# Patient Record
Sex: FEMALE | Race: WHITE | NOT HISPANIC OR LATINO | Employment: FULL TIME | URBAN - METROPOLITAN AREA
[De-identification: names, ages, dates, MRNs, and addresses within clinical notes are randomized per-mention and may not be internally consistent; named-entity substitution may affect disease eponyms.]

---

## 2017-07-13 ENCOUNTER — TRANSCRIBE ORDERS (OUTPATIENT)
Dept: LAB | Facility: HOSPITAL | Age: 52
End: 2017-07-13

## 2017-07-13 ENCOUNTER — APPOINTMENT (OUTPATIENT)
Dept: LAB | Facility: HOSPITAL | Age: 52
End: 2017-07-13

## 2017-07-13 ENCOUNTER — HOSPITAL ENCOUNTER (OUTPATIENT)
Dept: GENERAL RADIOLOGY | Facility: HOSPITAL | Age: 52
Discharge: HOME | End: 2017-07-13
Admitting: INTERNAL MEDICINE

## 2017-07-13 ENCOUNTER — TRANSCRIBE ORDERS (OUTPATIENT)
Dept: GENERAL RADIOLOGY | Facility: HOSPITAL | Age: 52
End: 2017-07-13

## 2017-07-13 DIAGNOSIS — M79.641 PAIN IN BOTH HANDS: Primary | ICD-10-CM

## 2017-07-13 DIAGNOSIS — M79.642 PAIN IN BOTH HANDS: Primary | ICD-10-CM

## 2017-07-13 DIAGNOSIS — L40.0 PSORIASIS VULGARIS: Primary | ICD-10-CM

## 2017-07-13 DIAGNOSIS — Z79.899 ENCOUNTER FOR LONG-TERM (CURRENT) USE OF MEDICATIONS: ICD-10-CM

## 2017-07-13 LAB
ALBUMIN SERPL-MCNC: 4.3 G/DL (ref 3.2–4.8)
ALBUMIN/GLOB SERPL: 1.4 G/DL (ref 1.5–2.5)
ALP SERPL-CCNC: 86 U/L (ref 25–100)
ALT SERPL W P-5'-P-CCNC: 20 U/L (ref 7–40)
ANION GAP SERPL CALCULATED.3IONS-SCNC: 9 MMOL/L (ref 3–11)
AST SERPL-CCNC: 16 U/L (ref 0–33)
BASOPHILS # BLD AUTO: 0.02 10*3/MM3 (ref 0–0.2)
BASOPHILS NFR BLD AUTO: 0.2 % (ref 0–1)
BILIRUB SERPL-MCNC: 0.4 MG/DL (ref 0.3–1.2)
BUN BLD-MCNC: 18 MG/DL (ref 9–23)
BUN/CREAT SERPL: 30 (ref 7–25)
CALCIUM SPEC-SCNC: 9.5 MG/DL (ref 8.7–10.4)
CHLORIDE SERPL-SCNC: 100 MMOL/L (ref 99–109)
CO2 SERPL-SCNC: 27 MMOL/L (ref 20–31)
CREAT BLD-MCNC: 0.6 MG/DL (ref 0.6–1.3)
DEPRECATED RDW RBC AUTO: 48.2 FL (ref 37–54)
EOSINOPHIL # BLD AUTO: 0.53 10*3/MM3 (ref 0–0.3)
EOSINOPHIL NFR BLD AUTO: 5.6 % (ref 0–3)
ERYTHROCYTE [DISTWIDTH] IN BLOOD BY AUTOMATED COUNT: 14.8 % (ref 11.3–14.5)
GFR SERPL CREATININE-BSD FRML MDRD: 104 ML/MIN/1.73
GLOBULIN UR ELPH-MCNC: 3.1 GM/DL
GLUCOSE BLD-MCNC: 91 MG/DL (ref 70–100)
HCT VFR BLD AUTO: 39.5 % (ref 34.5–44)
HGB BLD-MCNC: 12.6 G/DL (ref 11.5–15.5)
IMM GRANULOCYTES # BLD: 0.02 10*3/MM3 (ref 0–0.03)
IMM GRANULOCYTES NFR BLD: 0.2 % (ref 0–0.6)
LYMPHOCYTES # BLD AUTO: 1.96 10*3/MM3 (ref 0.6–4.8)
LYMPHOCYTES NFR BLD AUTO: 20.7 % (ref 24–44)
MCH RBC QN AUTO: 28.6 PG (ref 27–31)
MCHC RBC AUTO-ENTMCNC: 31.9 G/DL (ref 32–36)
MCV RBC AUTO: 89.6 FL (ref 80–99)
MONOCYTES # BLD AUTO: 0.34 10*3/MM3 (ref 0–1)
MONOCYTES NFR BLD AUTO: 3.6 % (ref 0–12)
NEUTROPHILS # BLD AUTO: 6.61 10*3/MM3 (ref 1.5–8.3)
NEUTROPHILS NFR BLD AUTO: 69.7 % (ref 41–71)
PLATELET # BLD AUTO: 297 10*3/MM3 (ref 150–450)
PMV BLD AUTO: 11.3 FL (ref 6–12)
POTASSIUM BLD-SCNC: 3.4 MMOL/L (ref 3.5–5.5)
PROT SERPL-MCNC: 7.4 G/DL (ref 5.7–8.2)
RBC # BLD AUTO: 4.41 10*6/MM3 (ref 3.89–5.14)
SODIUM BLD-SCNC: 136 MMOL/L (ref 132–146)
WBC NRBC COR # BLD: 9.48 10*3/MM3 (ref 3.5–10.8)

## 2017-07-13 PROCEDURE — 80053 COMPREHEN METABOLIC PANEL: CPT | Performed by: DERMATOLOGY

## 2017-07-13 PROCEDURE — 36415 COLL VENOUS BLD VENIPUNCTURE: CPT | Performed by: DERMATOLOGY

## 2017-07-13 PROCEDURE — 85025 COMPLETE CBC W/AUTO DIFF WBC: CPT | Performed by: DERMATOLOGY

## 2017-07-13 PROCEDURE — 86480 TB TEST CELL IMMUN MEASURE: CPT | Performed by: DERMATOLOGY

## 2017-07-13 PROCEDURE — 73130 X-RAY EXAM OF HAND: CPT

## 2017-07-17 LAB
INTERPRETATION: NORMAL
M TB TUBERC IFN-G BLD QL: NEGATIVE
QFT TB AG MINUS NIL VALUE: <0 IU/ML
QUANTIFERON CRITERIA: NORMAL
QUANTIFERON MITOGEN VALUE: 9.36 IU/ML
QUANTIFERON NIL VALUE: 0.09 IU/ML
QUANTIFERON TB AG VALUE: 0.05 IU/ML

## 2018-11-26 ENCOUNTER — TELEPHONE (OUTPATIENT)
Dept: INTERNAL MEDICINE | Facility: CLINIC | Age: 53
End: 2018-11-26

## 2021-10-11 ENCOUNTER — APPOINTMENT (OUTPATIENT)
Dept: CT IMAGING | Facility: HOSPITAL | Age: 56
End: 2021-10-11

## 2021-10-11 ENCOUNTER — HOSPITAL ENCOUNTER (EMERGENCY)
Facility: HOSPITAL | Age: 56
Discharge: HOME | End: 2021-10-11
Attending: EMERGENCY MEDICINE | Admitting: EMERGENCY MEDICINE

## 2021-10-11 ENCOUNTER — APPOINTMENT (OUTPATIENT)
Dept: GENERAL RADIOLOGY | Facility: HOSPITAL | Age: 56
End: 2021-10-11

## 2021-10-11 VITALS
RESPIRATION RATE: 20 BRPM | BODY MASS INDEX: 38.27 KG/M2 | WEIGHT: 230 LBS | HEART RATE: 73 BPM | TEMPERATURE: 99.7 F | OXYGEN SATURATION: 97 % | SYSTOLIC BLOOD PRESSURE: 129 MMHG | DIASTOLIC BLOOD PRESSURE: 54 MMHG

## 2021-10-11 DIAGNOSIS — U07.1 PNEUMONIA DUE TO COVID-19 VIRUS: Primary | ICD-10-CM

## 2021-10-11 DIAGNOSIS — J12.82 PNEUMONIA DUE TO COVID-19 VIRUS: Primary | ICD-10-CM

## 2021-10-11 DIAGNOSIS — R09.02 HYPOXIA: ICD-10-CM

## 2021-10-11 LAB
ALBUMIN SERPL-MCNC: 3.7 G/DL (ref 3.5–5.2)
ALBUMIN/GLOB SERPL: 1.2 G/DL
ALP SERPL-CCNC: 67 U/L (ref 39–117)
ALT SERPL W P-5'-P-CCNC: 18 U/L (ref 1–33)
ANION GAP SERPL CALCULATED.3IONS-SCNC: 11 MMOL/L (ref 5–15)
AST SERPL-CCNC: 25 U/L (ref 1–32)
BASOPHILS # BLD AUTO: 0 10*3/MM3 (ref 0–0.2)
BASOPHILS NFR BLD AUTO: 0 % (ref 0–1.5)
BILIRUB SERPL-MCNC: 0.2 MG/DL (ref 0–1.2)
BUN SERPL-MCNC: 13 MG/DL (ref 6–20)
BUN/CREAT SERPL: 19.1 (ref 7–25)
CALCIUM SPEC-SCNC: 8 MG/DL (ref 8.6–10.5)
CHLORIDE SERPL-SCNC: 99 MMOL/L (ref 98–107)
CO2 SERPL-SCNC: 25 MMOL/L (ref 22–29)
CREAT SERPL-MCNC: 0.68 MG/DL (ref 0.57–1)
CRP SERPL-MCNC: 4.41 MG/DL (ref 0–0.5)
D DIMER PPP FEU-MCNC: 0.59 MCGFEU/ML (ref 0–0.57)
D-LACTATE SERPL-SCNC: 0.6 MMOL/L (ref 0.5–2)
DEPRECATED RDW RBC AUTO: 45.2 FL (ref 37–54)
EOSINOPHIL # BLD AUTO: 0 10*3/MM3 (ref 0–0.4)
EOSINOPHIL NFR BLD AUTO: 0 % (ref 0.3–6.2)
ERYTHROCYTE [DISTWIDTH] IN BLOOD BY AUTOMATED COUNT: 14.2 % (ref 12.3–15.4)
GFR SERPL CREATININE-BSD FRML MDRD: 89 ML/MIN/1.73
GLOBULIN UR ELPH-MCNC: 3.2 GM/DL
GLUCOSE SERPL-MCNC: 130 MG/DL (ref 65–99)
HCT VFR BLD AUTO: 34.6 % (ref 34–46.6)
HGB BLD-MCNC: 11.7 G/DL (ref 12–15.9)
IMM GRANULOCYTES # BLD AUTO: 0.01 10*3/MM3 (ref 0–0.05)
IMM GRANULOCYTES NFR BLD AUTO: 0.3 % (ref 0–0.5)
LYMPHOCYTES # BLD AUTO: 0.56 10*3/MM3 (ref 0.7–3.1)
LYMPHOCYTES NFR BLD AUTO: 16.5 % (ref 19.6–45.3)
MCH RBC QN AUTO: 29.3 PG (ref 26.6–33)
MCHC RBC AUTO-ENTMCNC: 33.8 G/DL (ref 31.5–35.7)
MCV RBC AUTO: 86.5 FL (ref 79–97)
MONOCYTES # BLD AUTO: 0.1 10*3/MM3 (ref 0.1–0.9)
MONOCYTES NFR BLD AUTO: 2.9 % (ref 5–12)
NEUTROPHILS NFR BLD AUTO: 2.72 10*3/MM3 (ref 1.7–7)
NEUTROPHILS NFR BLD AUTO: 80.3 % (ref 42.7–76)
NRBC BLD AUTO-RTO: 0 /100 WBC (ref 0–0.2)
NT-PROBNP SERPL-MCNC: 169.7 PG/ML (ref 0–900)
PLATELET # BLD AUTO: 151 10*3/MM3 (ref 140–450)
PMV BLD AUTO: 10.4 FL (ref 6–12)
POTASSIUM SERPL-SCNC: 3.6 MMOL/L (ref 3.5–5.2)
PROCALCITONIN SERPL-MCNC: 0.09 NG/ML (ref 0–0.25)
PROT SERPL-MCNC: 6.9 G/DL (ref 6–8.5)
RBC # BLD AUTO: 4 10*6/MM3 (ref 3.77–5.28)
SODIUM SERPL-SCNC: 135 MMOL/L (ref 136–145)
TROPONIN T SERPL-MCNC: <0.01 NG/ML (ref 0–0.03)
WBC # BLD AUTO: 3.39 10*3/MM3 (ref 3.4–10.8)

## 2021-10-11 PROCEDURE — 84145 PROCALCITONIN (PCT): CPT | Performed by: EMERGENCY MEDICINE

## 2021-10-11 PROCEDURE — 99284 EMERGENCY DEPT VISIT MOD MDM: CPT

## 2021-10-11 PROCEDURE — 96374 THER/PROPH/DIAG INJ IV PUSH: CPT

## 2021-10-11 PROCEDURE — 96375 TX/PRO/DX INJ NEW DRUG ADDON: CPT

## 2021-10-11 PROCEDURE — 85379 FIBRIN DEGRADATION QUANT: CPT | Performed by: EMERGENCY MEDICINE

## 2021-10-11 PROCEDURE — 93005 ELECTROCARDIOGRAM TRACING: CPT | Performed by: EMERGENCY MEDICINE

## 2021-10-11 PROCEDURE — 94640 AIRWAY INHALATION TREATMENT: CPT

## 2021-10-11 PROCEDURE — 83605 ASSAY OF LACTIC ACID: CPT | Performed by: EMERGENCY MEDICINE

## 2021-10-11 PROCEDURE — 84484 ASSAY OF TROPONIN QUANT: CPT | Performed by: EMERGENCY MEDICINE

## 2021-10-11 PROCEDURE — 25010000002 IOPAMIDOL 61 % SOLUTION: Performed by: EMERGENCY MEDICINE

## 2021-10-11 PROCEDURE — 71275 CT ANGIOGRAPHY CHEST: CPT

## 2021-10-11 PROCEDURE — 71045 X-RAY EXAM CHEST 1 VIEW: CPT

## 2021-10-11 PROCEDURE — 63710000001 PROMETHAZINE PER 25 MG: Performed by: EMERGENCY MEDICINE

## 2021-10-11 PROCEDURE — 25010000002 ONDANSETRON PER 1 MG: Performed by: EMERGENCY MEDICINE

## 2021-10-11 PROCEDURE — 83880 ASSAY OF NATRIURETIC PEPTIDE: CPT | Performed by: EMERGENCY MEDICINE

## 2021-10-11 PROCEDURE — 85025 COMPLETE CBC W/AUTO DIFF WBC: CPT | Performed by: EMERGENCY MEDICINE

## 2021-10-11 PROCEDURE — 25010000002 DEXAMETHASONE SODIUM PHOSPHATE 10 MG/ML SOLUTION: Performed by: EMERGENCY MEDICINE

## 2021-10-11 PROCEDURE — 80053 COMPREHEN METABOLIC PANEL: CPT | Performed by: EMERGENCY MEDICINE

## 2021-10-11 PROCEDURE — 86140 C-REACTIVE PROTEIN: CPT | Performed by: EMERGENCY MEDICINE

## 2021-10-11 RX ORDER — ONDANSETRON 2 MG/ML
4 INJECTION INTRAMUSCULAR; INTRAVENOUS ONCE
Status: COMPLETED | OUTPATIENT
Start: 2021-10-11 | End: 2021-10-11

## 2021-10-11 RX ORDER — DEXAMETHASONE SODIUM PHOSPHATE 10 MG/ML
6 INJECTION, SOLUTION INTRAMUSCULAR; INTRAVENOUS ONCE
Status: COMPLETED | OUTPATIENT
Start: 2021-10-11 | End: 2021-10-11

## 2021-10-11 RX ORDER — PROMETHAZINE HYDROCHLORIDE 25 MG/1
25 TABLET ORAL EVERY 6 HOURS PRN
Qty: 20 TABLET | Refills: 1 | Status: SHIPPED | OUTPATIENT
Start: 2021-10-11 | End: 2021-11-15

## 2021-10-11 RX ORDER — DEXAMETHASONE 6 MG/1
6 TABLET ORAL
Qty: 5 TABLET | Refills: 0 | Status: SHIPPED | OUTPATIENT
Start: 2021-10-11 | End: 2021-11-15

## 2021-10-11 RX ORDER — SODIUM CHLORIDE 0.9 % (FLUSH) 0.9 %
10 SYRINGE (ML) INJECTION AS NEEDED
Status: DISCONTINUED | OUTPATIENT
Start: 2021-10-11 | End: 2021-10-11 | Stop reason: HOSPADM

## 2021-10-11 RX ORDER — PROMETHAZINE HYDROCHLORIDE 25 MG/1
25 TABLET ORAL ONCE
Status: COMPLETED | OUTPATIENT
Start: 2021-10-11 | End: 2021-10-11

## 2021-10-11 RX ORDER — ALBUTEROL SULFATE 90 UG/1
2 AEROSOL, METERED RESPIRATORY (INHALATION) ONCE
Status: COMPLETED | OUTPATIENT
Start: 2021-10-11 | End: 2021-10-11

## 2021-10-11 RX ORDER — ALBUTEROL SULFATE 2.5 MG/3ML
2.5 SOLUTION RESPIRATORY (INHALATION) EVERY 4 HOURS PRN
Qty: 25 EACH | Refills: 2 | Status: SHIPPED | OUTPATIENT
Start: 2021-10-11 | End: 2021-10-15 | Stop reason: SDUPTHER

## 2021-10-11 RX ORDER — BUDESONIDE 0.5 MG/2ML
0.5 INHALANT ORAL 2 TIMES DAILY
Qty: 30 EACH | Refills: 1 | Status: SHIPPED | OUTPATIENT
Start: 2021-10-11 | End: 2021-10-15 | Stop reason: SDUPTHER

## 2021-10-11 RX ADMIN — PROMETHAZINE HYDROCHLORIDE 25 MG: 25 TABLET ORAL at 05:29

## 2021-10-11 RX ADMIN — IOPAMIDOL 62 ML: 612 INJECTION, SOLUTION INTRAVENOUS at 05:11

## 2021-10-11 RX ADMIN — ALBUTEROL SULFATE 2 PUFF: 90 AEROSOL, METERED RESPIRATORY (INHALATION) at 04:25

## 2021-10-11 RX ADMIN — ONDANSETRON 4 MG: 2 INJECTION INTRAMUSCULAR; INTRAVENOUS at 04:18

## 2021-10-11 RX ADMIN — DEXAMETHASONE SODIUM PHOSPHATE 6 MG: 10 INJECTION, SOLUTION INTRAMUSCULAR; INTRAVENOUS at 04:20

## 2021-10-11 NOTE — ED NOTES
Pt shared concerns about being discharged.  notified. Pt concerns addressed with pt.      Piedad Chandler, RN  10/11/21 2302

## 2021-10-11 NOTE — CASE MANAGEMENT/SOCIAL WORK
Continued Stay Note   [facility]     Patient Name: Nesha Dominguez  MRN: [id number]  Today's Date: 10/11/2021    Admit Date: 10/11/2021     Discharge Plan     Row Name 10/11/21 0841       Plan    Plan Spoke to patient , explained O2, nebulizer orders sent. He will transport patient home when she is ready.    Row Name 10/11/21 0836       Plan    Plan Discharge plan, faxed orders for o2 and nebulizer to DME company. Message left for patient.               Discharge Codes    No documentation.                     Amy Osorio LCSW

## 2021-10-11 NOTE — ED PROVIDER NOTES
Subjective   History of Present Illness    Chief Complaint: Nausea shortness of breath cough, reported hypoxia at home  History of Present Illness: 56-year-old female approximately 1 week of Covid symptoms, worse over the last day or 2.  Covid +5 days ago.  Reports room air sats 84% with exertion at home.  States she feels more short of breath when she was try to go to sleep  Onset: See above timeline  Duration: Persistent  Exacerbating / Alleviating factors: None  Associated symptoms: None      Nurses Notes reviewed and agree, including vitals, allergies, social history and prior medical history.     REVIEW OF SYSTEMS: All systems reviewed and not pertinent unless noted.    Positive for: Nausea shortness of breath Covid positive, reported hypoxia    Negative for: Hemoptysis syncope chest pain palpitations  Review of Systems    Past Medical History:   Diagnosis Date   • Fatigue    • Meniere's disease    • Psoriatic arthritis (HCC)        Allergies   Allergen Reactions   • Biaxin [Clarithromycin]    • Levaquin [Levofloxacin]        Past Surgical History:   Procedure Laterality Date   •  SECTION     • DILATATION AND CURETTAGE     • MOUTH SURGERY      Tooth extraction   • OTHER SURGICAL HISTORY      Renal Lithotripsy       History reviewed. No pertinent family history.    Social History     Socioeconomic History   • Marital status:    Tobacco Use   • Smoking status: Never Smoker   Substance and Sexual Activity   • Alcohol use: Never   • Drug use: Never           Objective   Physical Exam    CONSTITUTIONAL: Well developed, obese nontoxic 56-year-old  female,  in no acute distress.  VITAL SIGNS: per nursing, reviewed and noted  SKIN: exposed skin with no rashes, ulcerations or petechiae.  EYES: perrla. EOMI.  ENT: Normal voice.  Patient maintained wearing a mask throughout patient encounter due to coronavirus pandemic  RESPIRATORY:  No increased work of breathing. No retractions.  Dry cough.   Chest clear auscultation bilaterally  CARDIOVASCULAR:  regular rate and rhythm, no murmurs.  Good Peripheral pulses. Good cap refill to extremities.   GI: Abdomen soft, nontender, normal bowel sounds. No hernia. No ascites.  MUSCULOSKELETAL:  No tenderness. Full ROM. Strength and tone grossly normal.  no spasms. no neck or back tenderness or spasm.   NEUROLOGIC: Alert, oriented x 3. No gross deficits. GCS 15.   PSYCH: appropriate affect.  : no bladder tenderness or distention, no CVA tenderness      Critical Care  Performed by: Librado Plaza DO  Authorized by: Librado Plaza DO     Critical care provider statement:     Critical care time (minutes):  35    Critical care was necessary to treat or prevent imminent or life-threatening deterioration of the following conditions:  Respiratory failure    Critical care was time spent personally by me on the following activities:  Development of treatment plan with patient or surrogate, discussions with consultants, evaluation of patient's response to treatment, examination of patient, ordering and performing treatments and interventions, ordering and review of laboratory studies, ordering and review of radiographic studies, re-evaluation of patient's condition and pulse oximetry         No attending physician procedures were performed on this patient.      ED Course  ED Course as of 10/11/21 0718   Mon Oct 11, 2021   0528 Lactate: 0.6 [PF]   0528 C-Reactive Protein(!): 4.41 [PF]   0528 Troponin T: <0.010 [PF]   0528 Procalcitonin: 0.09 [PF]   0528 proBNP: 169.7 [PF]   0528 D-Dimer, Quant(!!): 0.59 [PF]   0529 WBC(!): 3.39 [PF]   0529 Hemoglobin(!): 11.7 [PF]   0529 Hematocrit: 34.6 [PF]   0529 Platelets: 151 [PF]   0529 Glucose(!): 130 [PF]   0529 BUN: 13 [PF]   0529 Creatinine: 0.68 [PF]   0529 Sodium(!): 135 [PF]   0529 Potassium: 3.6 [PF]   0529 Chloride: 99 [PF]   0529 CO2: 25.0 [PF]   0529 Calcium(!): 8.0 [PF]   0529 Total Protein: 6.9 [PF]   0529 Albumin: 3.70  [PF]   0529 ALT (SGPT): 18 [PF]   0529 AST (SGOT): 25 [PF]   0529 Alkaline Phosphatase: 67 [PF]   0529 Total Bilirubin: 0.2 [PF]   0633 Technique: CT angiogram from the lung apices through the upper abdomen with intravenous contrast. Coronal reformations. Right and left oblique coronal MIPS.     Comparison: None     Findings:     Unremarkable thyroid.     No pulmonary embolism mildly limited in the lower lobes by respiratory motion.     No aortic aneurysm or dissection.     Mediastinal lymph nodes with calcified prevascular lymph nodes.     Heart size is mildly enlarged.     Diffuse bilateral hazy nodular opacities in a bronchovascular distribution.     No pleural effusions.     Hepatic steatosis.     Splenomegaly.     Degenerative change of the thoracic spine without acute or suspicious pathology.     IMPRESSION:  Impression:     1. No urinary embolism mild limited in the lower lobes secondary to motion artifact.     2. Diffuse bilateral hazy nodular opacities in a bronchovascular distribution. Findings consistent with pneumonia likely atypical.     Authenticated by Torrey Solares MD on 10/11/2021 06:22:21 AM [PF]   0648 EKG interpreted by me reveals sinus rhythm rate is 82. No ectopy no ischemic changes [PF]      ED Course User Index  [PF] Librado Plaza, DO      Chest x-ray interpreted by me reveals bilateral mild diffuse interstitial infiltrates consistent with COVID-19 pneumonia.       Patient ambulatory sats of 87% on room air.   Oxygen saturations are 97% on 2 L nasal cannula.  Discussed patient with Dr. Daley for admission.  Advised home oxygen, return if resting sats decreased on patient supplemental O2 liters nasal cannula,.  Will add CT chest PE protocol given slight elevation of D-dimer to rule out PE.      I discussed with Dr. Daley hospitalist, recommended discharge with home oxygen, return if worsening symptoms.                             MDM  No evidence of PE on CT chest.  Patient still  maintaining oxygen saturations at 97% on 2 L nasal cannula.  Will discharge with Decadron, Pulmicort, albuterol, nebulizer, Phenergan, incentive spirometer, home oxygen.  Advised to return if hypoxia on 2 L at rest  Final diagnoses:   Pneumonia due to COVID-19 virus   Hypoxia       ED Disposition  ED Disposition     ED Disposition Condition Comment    Discharge Stable           No follow-up provider specified.       Medication List      New Prescriptions    albuterol (2.5 MG/3ML) 0.083% nebulizer solution  Commonly known as: PROVENTIL  Take 2.5 mg by nebulization Every 4 (Four) Hours As Needed for Wheezing.     budesonide 0.5 MG/2ML nebulizer solution  Commonly known as: Pulmicort  Take 2 mL by nebulization 2 (two) times a day.     dexamethasone 6 MG tablet  Commonly known as: DECADRON  Take 1 tablet by mouth Daily With Breakfast.     promethazine 25 MG tablet  Commonly known as: PHENERGAN  Take 1 tablet by mouth Every 6 (Six) Hours As Needed for Nausea or Vomiting.           Where to Get Your Medications      These medications were sent to [id number] - [address] [employer] [place] AT [facility] - [phone]  - [phone] [address] [facility] [address]    Phone: [phone]   · albuterol (2.5 MG/3ML) 0.083% nebulizer solution  · budesonide 0.5 MG/2ML nebulizer solution  · dexamethasone 6 MG tablet  · promethazine 25 MG tablet          Librado Plaza DO  10/11/21 0718

## 2021-10-11 NOTE — ED NOTES
, Amy called and stated it would be another hour before Ana is here with the home O2.      Ofelia Russ  10/11/21 8503

## 2021-10-11 NOTE — ED NOTES
Awaiting call back from Amy Osorio to set up home oxygen.  Left message for call back.     Jeannine Sylvester RN  10/11/21 0725

## 2021-10-11 NOTE — ED NOTES
Contacted Amy elizondo again. No answer at this time. Voicemail box is full so unable to leave a message.      Ofelia Russ  10/11/21 0755

## 2021-10-11 NOTE — ED NOTES
Pt's oxygen saturation dropped to 86% while ambulating.  notified.      Piedad Chandler, RN  10/11/21 8044

## 2021-10-11 NOTE — CASE MANAGEMENT/SOCIAL WORK
Continued Stay Note   [facility]     Patient Name: Nesha Dominguez  MRN: [id number]  Today's Date: 10/11/2021    Admit Date: 10/11/2021     Discharge Plan     Row Name 10/11/21 0836       Plan    Plan Discharge plan, faxed orders for o2 and nebulizer to DME company. Message left for patient.               Discharge Codes    No documentation.                     Amy Osorio LCSW

## 2021-10-11 NOTE — CASE MANAGEMENT/SOCIAL WORK
Continued Stay Note  [facility]     Patient Name: Nesha Dominguez  MRN: [id number]  Today's Date: 10/11/2021    Admit Date: 10/11/2021     Discharge Plan     Row Name 10/11/21 1028       Plan    Plan Spoke to Karthik at [facility] and they are bringing o2 tank, verified documentaion of low room air sat for o2.    Row Name 10/11/21 0841       Plan    Plan Spoke to patient , explained O2, nebulizer orders sent. He will transport patient home when she is ready.    Row Name 10/11/21 0836       Plan    Plan Discharge plan, faxed orders for o2 and nebulizer to DME company. Message left for patient.               Discharge Codes    No documentation.                     Amy Osorio LCSW

## 2021-10-11 NOTE — ED NOTES
Contacted case managementAmy for home oxygen set up. A voicemail was left requesting a call back.     Ofelia Russ  10/11/21 5041

## 2021-10-15 ENCOUNTER — TELEMEDICINE (OUTPATIENT)
Dept: INTERNAL MEDICINE | Facility: CLINIC | Age: 56
End: 2021-10-15

## 2021-10-15 ENCOUNTER — TELEPHONE (OUTPATIENT)
Dept: INTERNAL MEDICINE | Facility: CLINIC | Age: 56
End: 2021-10-15

## 2021-10-15 VITALS — HEART RATE: 67 BPM | TEMPERATURE: 97.9 F | OXYGEN SATURATION: 88 %

## 2021-10-15 DIAGNOSIS — U07.1 PNEUMONIA DUE TO COVID-19 VIRUS: Primary | ICD-10-CM

## 2021-10-15 DIAGNOSIS — J12.82 PNEUMONIA DUE TO COVID-19 VIRUS: Primary | ICD-10-CM

## 2021-10-15 PROCEDURE — 99203 OFFICE O/P NEW LOW 30 MIN: CPT | Performed by: INTERNAL MEDICINE

## 2021-10-15 RX ORDER — FLUTICASONE PROPIONATE 50 MCG
SPRAY, SUSPENSION (ML) NASAL
COMMUNITY

## 2021-10-15 RX ORDER — ALBUTEROL SULFATE 2.5 MG/3ML
2.5 SOLUTION RESPIRATORY (INHALATION) EVERY 4 HOURS PRN
Qty: 25 EACH | Refills: 2 | Status: SHIPPED | OUTPATIENT
Start: 2021-10-15 | End: 2021-12-01 | Stop reason: SDUPTHER

## 2021-10-15 RX ORDER — IXEKIZUMAB 80 MG/ML
INJECTION, SOLUTION SUBCUTANEOUS
COMMUNITY

## 2021-10-15 RX ORDER — GUAIFENESIN 600 MG/1
1200 TABLET, EXTENDED RELEASE ORAL 2 TIMES DAILY PRN
Qty: 30 TABLET | Refills: 1 | Status: SHIPPED | OUTPATIENT
Start: 2021-10-15 | End: 2021-11-15

## 2021-10-15 RX ORDER — AZITHROMYCIN 250 MG/1
TABLET, FILM COATED ORAL
Qty: 6 TABLET | Refills: 0 | Status: SHIPPED | OUTPATIENT
Start: 2021-10-15 | End: 2021-11-15

## 2021-10-15 RX ORDER — BUDESONIDE 0.5 MG/2ML
0.5 INHALANT ORAL 2 TIMES DAILY
Qty: 30 EACH | Refills: 1 | Status: SHIPPED | OUTPATIENT
Start: 2021-10-15 | End: 2021-12-01 | Stop reason: SDUPTHER

## 2021-10-15 RX ORDER — MELATONIN: COMMUNITY

## 2021-10-15 RX ORDER — LORATADINE 10 MG/1
CAPSULE, LIQUID FILLED ORAL
COMMUNITY

## 2021-10-15 RX ORDER — DEXAMETHASONE 6 MG/1
6 TABLET ORAL
Qty: 5 TABLET | Refills: 0 | Status: CANCELLED | OUTPATIENT
Start: 2021-10-15

## 2021-10-15 RX ORDER — DIAZEPAM 2 MG/1
TABLET ORAL
COMMUNITY

## 2021-10-15 NOTE — TELEPHONE ENCOUNTER
Caller: Nesha Dominguez    Relationship to patient: Self    Best call back number: [phone]    Chief complaint: COVID RELATED SYMPTOMS     Type of visit: MYCHART VIDEO VISIT    Requested date: ASAP    If rescheduling, when is the original appointment: N/A    Additional notes: PATIENT IS NOT FEELING GOOD AT ALL AND WOULD NEED TO SEE SOMEONE IN OFFICE TODAY    PLEASE ADVISE ASAP

## 2021-10-15 NOTE — PROGRESS NOTES
Chief Complaint   Patient presents with   • Abstract     COVID; 1 week     Subjective   Nesha Dominguez is a 56 y.o. female.     Patient has been diagnosed with Covid approximately 1 week ago.  She is having significant shortness of breath.   Her oxygen is running 90-94 % during day on 2 liters oxygen.   She was seen in ER 4 days ago and given decadron, pulmicort and albuterol nebulizers.  Received order for oxygen at that time.  Cough is dry and nonproductive at this time.    Burning with urination for past few days.  No frequency or urgency      Cough  This is a new problem. The current episode started in the past 7 days. The problem has been gradually worsening. Associated symptoms include a fever, nasal congestion, postnasal drip, rhinorrhea, shortness of breath and wheezing. Pertinent negatives include no chest pain, chills, ear pain, hemoptysis or sore throat. The symptoms are aggravated by lying down. She has tried oral steroids (See above) for the symptoms. The treatment provided mild relief.        The following portions of the patient's history were reviewed and updated as appropriate: allergies, current medications, past family history, past medical history, past social history, past surgical history and problem list.    Review of Systems   Constitutional: Positive for fever. Negative for chills.   HENT: Positive for postnasal drip and rhinorrhea. Negative for ear pain and sore throat.    Respiratory: Positive for cough, shortness of breath and wheezing. Negative for hemoptysis.    Cardiovascular: Negative for chest pain.   Gastrointestinal: Positive for nausea. Negative for vomiting.   Neurological: Positive for weakness.       Objective   Pulse 67   Temp 97.9 °F (36.6 °C)   SpO2 (!) 88% Comment: Patient reported; on 2 liters of O2  There is no height or weight on file to calculate BMI.  Physical Exam  Vitals and nursing note reviewed.   Constitutional:       Appearance: Normal appearance. She is obese.  She is ill-appearing.      Comments: Pleasant female, appears her age, on 2 L of oxygen per nasal cannula   HENT:      Head: Normocephalic and atraumatic.      Right Ear: External ear normal.      Left Ear: External ear normal.   Eyes:      General:         Right eye: No discharge.         Left eye: No discharge.      Extraocular Movements: Extraocular movements intact.   Pulmonary:      Effort: Pulmonary effort is normal. No respiratory distress.      Comments: Patient is able to speak in full sentences, she is on oxygen at 2 L per nasal cannula, face is slightly flushed throughout our discussion today  Neurological:      General: No focal deficit present.      Mental Status: She is alert and oriented to person, place, and time.      Cranial Nerves: No cranial nerve deficit.   Psychiatric:         Behavior: Behavior normal.         Thought Content: Thought content normal.         Judgment: Judgment normal.      Comments: Flat affect, patient appears tired and weak         Assessment/Plan   Nesha Dominguez is here today and the following problems have been addressed:      Diagnoses and all orders for this visit:    1. Pneumonia due to COVID-19 virus (Primary)    Other orders  -     azithromycin (ZITHROMAX) 250 MG tablet; Take 2 tablets the first day, then 1 tablet daily for 4 days.  Dispense: 6 tablet; Refill: 0  -     guaiFENesin (Mucinex) 600 MG 12 hr tablet; Take 2 tablets by mouth 2 (Two) Times a Day As Needed for Cough or Congestion.  Dispense: 30 tablet; Refill: 1  -     nystatin (MYCOSTATIN) 100,000 unit/mL suspension; Swish and swallow 5 mL 4 (Four) Times a Day.  Dispense: 473 mL; Refill: 0  -     albuterol (PROVENTIL) (2.5 MG/3ML) 0.083% nebulizer solution; Take 2.5 mg by nebulization Every 4 (Four) Hours As Needed for Wheezing.  Dispense: 25 each; Refill: 2  -     budesonide (Pulmicort) 0.5 MG/2ML nebulizer solution; Take 2 mL by nebulization 2 (two) times a day.  Dispense: 30 each; Refill: 1    Patient  currently on oxygen at 2 L per nasal cannula due to worsening Covid infection  Provided with Z-Junito today, take as directed  Continue budesonide and albuterol nebulizers as discussed  Also given Mucinex to take twice daily as directed  Patient complains of possible early thrush due to use of budesonide nebulizer, given nystatin solution 5 mL 4 times daily swish and swallow for 10 days  Encourage patient to seek care at emergency room if pulmonary status worsens    Time devoted to visit: 25 minutes including time to review previous record, with 75% of time devoted to direct discussion  Patient presents during COVID-19 pandemic/federally declared [facility] public health emergency.  This service was conducted via telemedicine video visit via [employer].  Patient did not come to clinic due to Covid infection.    Please note that portions of this note were completed with a voice recognition program.  Efforts were made to edit dictation, but occasionally words are mistranscribed.You have chosen to receive care through a telehealth visit.  Do you consent to use a video/audio connection for your medical care today? Yes    Individuals involved in this encounter:    Renee Pirtle Courtney Clark, RMA Dr. M. Vermeesch

## 2021-10-15 NOTE — TELEPHONE ENCOUNTER
Caller: Nesha Dominguez    Relationship: Self      Medication requested (name and dosage): dexamethasone (DECADRON) 6 MG tablet    Pharmacy where request should be sent: [id number] [place], [place] - [address] [employer] [address] [facility] [place] & - [phone]  - [phone] FX     Additional details provided by patient: PATIENT STATES THAT SHE HAS AN UTI STARTING. PATIENT STATES THAT SHE HAS BLOOD IN HER URINE.    Best call back number: [phone]    Does the patient have less than a 3 day supply:  [x] Yes  [] No    Shira Aguilera Rep   10/15/21 09:59 EDT

## 2021-10-18 ENCOUNTER — TELEPHONE (OUTPATIENT)
Dept: INTERNAL MEDICINE | Facility: CLINIC | Age: 56
End: 2021-10-18

## 2021-10-18 RX ORDER — SULFAMETHOXAZOLE AND TRIMETHOPRIM 800; 160 MG/1; MG/1
1 TABLET ORAL 2 TIMES DAILY WITH MEALS
Qty: 14 TABLET | Refills: 0 | Status: SHIPPED | OUTPATIENT
Start: 2021-10-18 | End: 2021-11-15

## 2021-10-18 NOTE — TELEPHONE ENCOUNTER
Please tell patient I have sent in Bactrim DS twice daily for 7 days.  Ask her how she is feeling, as her cough and shortness of breath improving?

## 2021-10-18 NOTE — TELEPHONE ENCOUNTER
Pt notified. States she's not any better since video visit. Still has SOA and cough, states she chest feels tight and she's still not coughing anything up.

## 2021-10-18 NOTE — TELEPHONE ENCOUNTER
What have her oxygen saturations been.  If she cannot maintain oxygen saturations above 92% on 2 L of oxygen that is worrisome.  If she continues to stay above 92%, then tell her to continue Pulmicort and albuterol nebulizers as scheduled.  I recommend deep breathing exercises, 10 deep breaths and hold each 1 5 seconds every 2 hours while awake.  Seek care at emergency room if respiratory status worsens.

## 2021-10-18 NOTE — TELEPHONE ENCOUNTER
PT CALLED STATED THAT THE ZPAK FOR UTI THAT WAS PRESCRIBED TO HER TO S NOT WORKING AND WAS TOLD TO CALL AND LET DOCTOR KNOW.    PLEASE ADVISE.  CALL BACK:[phone]        [facility] #[id number] - [place], [place] - [address] AT [facility] [address] [facility] [address] & - [phone]  - [phone] FX

## 2021-11-15 ENCOUNTER — OFFICE VISIT (OUTPATIENT)
Dept: INTERNAL MEDICINE | Facility: CLINIC | Age: 56
End: 2021-11-15

## 2021-11-15 VITALS
DIASTOLIC BLOOD PRESSURE: 80 MMHG | HEIGHT: 65 IN | HEART RATE: 85 BPM | TEMPERATURE: 97.1 F | WEIGHT: 240 LBS | OXYGEN SATURATION: 96 % | BODY MASS INDEX: 39.99 KG/M2 | SYSTOLIC BLOOD PRESSURE: 142 MMHG

## 2021-11-15 DIAGNOSIS — U07.1 PNEUMONIA DUE TO COVID-19 VIRUS: Primary | ICD-10-CM

## 2021-11-15 DIAGNOSIS — J12.82 PNEUMONIA DUE TO COVID-19 VIRUS: Primary | ICD-10-CM

## 2021-11-15 PROCEDURE — 99213 OFFICE O/P EST LOW 20 MIN: CPT | Performed by: INTERNAL MEDICINE

## 2021-11-15 NOTE — PROGRESS NOTES
Chief Complaint   Patient presents with   • Follow-up     from COVID. Pt states she's had some wheezing at night.     Subjective   Nesha Dominguez is a 56 y.o. female.     Here today for follow-up of Covid pneumonia.  Patient was diagnosed with Covid approximately October 4.  Her entire household was sick with a virus.  Her  and father were both hospitalized and her father passed away from virus.  She currently continues to have wheezing at night, current oxygen saturations are 96%.  She initially required oxygen to maintain saturations in the 80s.  She was seen in emergency room on October 11, 2021 at which time her saturations were 84%.  Her D-dimer was minimally elevated but CT scan did not reveal evidence of PE, however, was consistent with viral pneumonia.  She was treated with a Z-Junito and Decadron as well as Pulmicort for nebulizer machine.  At that time she was discharged with oxygen at 2 L per nasal cannula also.  She is still having some wheezing.  Has not needed her oxygen the past few nights.  She had been sleeping in a recliner until the past few nights.  The past few nights she did not need a nebulizer either.  She continues to use her pulmicort twice a day and is using the albuterol prn.  She is still using the albuterol about twice a day as needed, but is feeling less wheezing the past few days.  Her cough and wheeze is worse at night.  Her cough is dry. She is off mucinex.  Her O2 sats are above 92% even upon awakening at night without it.    She had been off dyazide until 3 days ago and then resumed it a few days ago and is tolerating it now.         The following portions of the patient's history were reviewed and updated as appropriate: allergies, current medications, past family history, past medical history, past social history, past surgical history and problem list.    Review of Systems   Constitutional: Positive for fatigue. Negative for chills and fever.   HENT: Negative.   "  Respiratory: Positive for cough and wheezing.    Gastrointestinal: Negative for diarrhea, nausea and vomiting.   Genitourinary: Negative.    Musculoskeletal: Negative.    Neurological: Negative for weakness and headaches.   Psychiatric/Behavioral: Positive for dysphoric mood. Negative for sleep disturbance. The patient is not nervous/anxious.        Objective   /80   Pulse 85   Temp 97.1 °F (36.2 °C)   Ht 165.1 cm (65\")   Wt 109 kg (240 lb)   SpO2 96%   BMI 39.94 kg/m²   Body mass index is 39.94 kg/m².  Physical Exam  Vitals and nursing note reviewed.   Constitutional:       General: She is not in acute distress.     Appearance: Normal appearance. She is well-developed. She is obese. She is not ill-appearing.      Comments: Kind and pleasant female, appears stated age and in NAD today   HENT:      Head: Normocephalic and atraumatic.      Right Ear: External ear normal.      Left Ear: External ear normal.   Eyes:      General:         Right eye: No discharge.         Left eye: No discharge.      Extraocular Movements: Extraocular movements intact.      Conjunctiva/sclera: Conjunctivae normal.      Pupils: Pupils are equal, round, and reactive to light.   Neck:      Thyroid: No thyromegaly.      Vascular: No carotid bruit.      Comments: No thyromegaly or mass  Cardiovascular:      Rate and Rhythm: Normal rate and regular rhythm.      Pulses: Normal pulses.      Heart sounds: Normal heart sounds. No murmur heard.      Pulmonary:      Effort: Pulmonary effort is normal. No respiratory distress.      Breath sounds: Wheezing present.      Comments: Faint wheezes over right anterior and posterior lobes  Abdominal:      General: Bowel sounds are normal. There is no distension.      Palpations: Abdomen is soft.      Tenderness: There is no abdominal tenderness.   Musculoskeletal:         General: Normal range of motion.      Cervical back: Normal range of motion and neck supple.      Right lower leg: No edema. "      Left lower leg: No edema.   Lymphadenopathy:      Cervical: No cervical adenopathy.   Skin:     General: Skin is warm.      Findings: No rash.   Neurological:      General: No focal deficit present.      Mental Status: She is alert and oriented to person, place, and time. Mental status is at baseline.      Cranial Nerves: No cranial nerve deficit.      Motor: No weakness.      Coordination: Coordination normal.      Gait: Gait normal.   Psychiatric:         Mood and Affect: Mood normal.         Behavior: Behavior normal.         Thought Content: Thought content normal.         Judgment: Judgment normal.         Assessment/Plan   Nesha Dominguez is here today and the following problems have been addressed:      Diagnoses and all orders for this visit:    1. Pneumonia due to COVID-19 virus (Primary)    She continues to have faint wheezing over right upper anterior and posterior lung fields  Continue Pulmicort nebulizer a.m. and p.m. and use albuterol nebulizer or inhaler as needed  She does not need to use Mucinex any longer as she has only a dry cough  Encouraged her to use incentive spirometer or do deep breathing exercises regularly  She has resumed her Dyazide due to use of inner ear problems in the past  Continue Dymista and Flonase for chronic allergy symptoms  She has resumed her Taltz for underlying psoriasis since her COVID illness is now essentially resolved  I recommend she hold off on COVID vaccine a little bit longer until perhaps first of year due to severity of her illness and reaction patients have with Covid vaccine    Return to clinic in 8 weeks for annual exam and COVID vaccine at that time    Please note that portions of this note were completed with a voice recognition program.  Efforts were made to edit dictation, but occasionally words are mistranscribed.

## 2021-11-22 ENCOUNTER — TELEPHONE (OUTPATIENT)
Dept: INTERNAL MEDICINE | Facility: CLINIC | Age: 56
End: 2021-11-22

## 2021-11-22 NOTE — TELEPHONE ENCOUNTER
Spoke with Pt in regards to this, states she has to have paperwork filled out by 12/4. I advised Pt that Dr. Vermeesch is out of the office this week and I'm not sure if another provider would be willing to fill out this paperwork. Advised Pt I would speak with Dr. Vermeesch first thing on Monday and let her know.   Pt stated she will speak with her boss tomorrow and if they give her any grief over waiting, she will call back to see about getting paperwork filled out.   I did speak with Dr. Ponce in regards to this, she is willing to write Dr. Vermeesch's recommendation and sign it as the on call provider, if needed.

## 2021-11-22 NOTE — TELEPHONE ENCOUNTER
Caller: Nesha Dominguez    Relationship: Self    What is the best time to reach you:ANYTIME     Who are you requesting to speak with (clinical staff, provider,  specific staff member): CLINICAL STAFF    What was the call regarding:PATIENT STATES THAT HER  DROPPED OFF AN EXEMPTION FROM GETTING THE COVID VACCINE FOR HER WORK SINCE SHE IS BEING ADVISED TO WAIT UNTIL January. PATIENT WOULD LIKE A CALL BACK TO MAKE SURE THAT HER PCP DID RECEIVE THIS AND WHEN THIS WILL BE AVAILABLE TO . SHE SAYS THAT SHE HAS TO HAVE THIS TURNED BACK IN TOMORROW.     Do you require a callback: YES

## 2021-12-01 RX ORDER — BUDESONIDE 0.5 MG/2ML
0.5 INHALANT ORAL 2 TIMES DAILY
Qty: 30 EACH | Refills: 1 | Status: SHIPPED | OUTPATIENT
Start: 2021-12-01

## 2021-12-01 RX ORDER — ALBUTEROL SULFATE 2.5 MG/3ML
2.5 SOLUTION RESPIRATORY (INHALATION) EVERY 4 HOURS PRN
Qty: 25 EACH | Refills: 2 | Status: SHIPPED | OUTPATIENT
Start: 2021-12-01 | End: 2021-12-17

## 2021-12-01 NOTE — TELEPHONE ENCOUNTER
Caller: Nesha Dominguez    Relationship: Self    Best call back number: [phone]  Requested Prescriptions:   Requested Prescriptions     Pending Prescriptions Disp Refills   • albuterol (PROVENTIL) (2.5 MG/3ML) 0.083% nebulizer solution 25 each 2     Sig: Take 2.5 mg by nebulization Every 4 (Four) Hours As Needed for Wheezing.   • budesonide (Pulmicort) 0.5 MG/2ML nebulizer solution 30 each 1     Sig: Take 2 mL by nebulization.        Pharmacy where request should be sent:      [id number] [address] [place] AT [facility] - [phone]  - [phone] FX        Additional details provided by patient:     Does the patient have less than a 3 day supply:  [x] Yes  [] No    Shira Hatfield Rep   12/01/21 15:52 EST

## 2021-12-17 RX ORDER — ALBUTEROL SULFATE 2.5 MG/3ML
SOLUTION RESPIRATORY (INHALATION)
Qty: 225 ML | Refills: 1 | Status: SHIPPED | OUTPATIENT
Start: 2021-12-17

## 2022-01-14 ENCOUNTER — TELEPHONE (OUTPATIENT)
Dept: INTERNAL MEDICINE | Facility: CLINIC | Age: 57
End: 2022-01-14

## 2022-01-14 NOTE — TELEPHONE ENCOUNTER
Either Moderna or Pfizer, it does not matter.  If she would like Pfizer vaccine we can provide that here at clinic.

## 2022-01-14 NOTE — TELEPHONE ENCOUNTER
Caller: Nesha Dominguez    Relationship: Self    Best call back number: [phone]    Who are you requesting to speak with (clinical staff, provider,  specific staff member): CLINICAL STAFF    What was the call regarding: PATIENT WOULD LIKE TO KNOW WHICH COVID VACCINE DR. VERMEESCH WOULD RECOMMEND SHE RECEIVE. PATIENT STATED SHE IS 3 MONTHS POST COVID -19 AS OF 1/6/22. PATIENT STATED SHE IS NOW ONLY OCCASIONALLY USING HER NEBULIZER.     Do you require a callback: YES

## 2022-07-05 ENCOUNTER — TELEPHONE (OUTPATIENT)
Dept: INTERNAL MEDICINE | Facility: CLINIC | Age: 57
End: 2022-07-05

## 2022-07-05 NOTE — TELEPHONE ENCOUNTER
Caller: Nesha Dominguez    Relationship to patient: Self    Best call back number: [phone]    Date of positive COVID19 test: 07/05/2022    COVID19 symptoms: STUFFY NOSE, BODY ACHES, HAD A FEVER    Additional information or concerns: PATIENT JUST CAME BACK FROM [place] VACATION ON July 3 AND STARTED HAVING SYMPTOMS ON 07/04/2022. PATIENT TESTED POSITIVE WITH HOME COVID TEST TODAY AND WANTED TO KNOW IF SHE QUALIFIES FOR ANY TREATMENT OR WHAT TO DO. PLEASE ADVISE AND CALL PATIENT BACK    What is the patients preferred pharmacy:[facility] [phone]

## 2024-04-24 ENCOUNTER — OFFICE VISIT (OUTPATIENT)
Dept: INTERNAL MEDICINE | Facility: CLINIC | Age: 59
End: 2024-04-24
Payer: COMMERCIAL

## 2024-04-24 VITALS
BODY MASS INDEX: 39.99 KG/M2 | SYSTOLIC BLOOD PRESSURE: 124 MMHG | HEART RATE: 66 BPM | OXYGEN SATURATION: 97 % | DIASTOLIC BLOOD PRESSURE: 82 MMHG | TEMPERATURE: 97 F | HEIGHT: 65 IN | WEIGHT: 240 LBS

## 2024-04-24 DIAGNOSIS — Z13.220 SCREENING CHOLESTEROL LEVEL: ICD-10-CM

## 2024-04-24 DIAGNOSIS — Z12.11 SCREENING FOR COLORECTAL CANCER: ICD-10-CM

## 2024-04-24 DIAGNOSIS — R53.83 FATIGUE, UNSPECIFIED TYPE: ICD-10-CM

## 2024-04-24 DIAGNOSIS — Z12.12 SCREENING FOR COLORECTAL CANCER: ICD-10-CM

## 2024-04-24 DIAGNOSIS — Z12.31 SCREENING MAMMOGRAM FOR BREAST CANCER: ICD-10-CM

## 2024-04-24 DIAGNOSIS — Z00.00 WELL ADULT EXAM: Primary | ICD-10-CM

## 2024-04-24 DIAGNOSIS — E55.9 VITAMIN D DEFICIENCY: ICD-10-CM

## 2024-04-24 DIAGNOSIS — E66.9 OBESITY WITHOUT SERIOUS COMORBIDITY, UNSPECIFIED CLASSIFICATION, UNSPECIFIED OBESITY TYPE: ICD-10-CM

## 2024-04-24 DIAGNOSIS — Z13.1 SCREENING FOR DIABETES MELLITUS: ICD-10-CM

## 2024-04-24 LAB
25(OH)D3+25(OH)D2 SERPL-MCNC: 45.6 NG/ML (ref 30–100)
ALBUMIN SERPL-MCNC: 4.2 G/DL (ref 3.5–5.2)
ALBUMIN/GLOB SERPL: 1.9 G/DL
ALP SERPL-CCNC: 83 U/L (ref 39–117)
ALT SERPL-CCNC: 30 U/L (ref 1–33)
AST SERPL-CCNC: 23 U/L (ref 1–32)
BASOPHILS # BLD AUTO: 0.03 10*3/MM3 (ref 0–0.2)
BASOPHILS NFR BLD AUTO: 0.4 % (ref 0–1.5)
BILIRUB SERPL-MCNC: 0.4 MG/DL (ref 0–1.2)
BUN SERPL-MCNC: 14 MG/DL (ref 8–23)
BUN/CREAT SERPL: 20.9 (ref 7–25)
CALCIUM SERPL-MCNC: 8.9 MG/DL (ref 8.6–10.5)
CHLORIDE SERPL-SCNC: 102 MMOL/L (ref 98–107)
CHOLEST SERPL-MCNC: 212 MG/DL (ref 0–200)
CO2 SERPL-SCNC: 26.7 MMOL/L (ref 22–29)
CREAT SERPL-MCNC: 0.67 MG/DL (ref 0.57–1)
EGFRCR SERPLBLD CKD-EPI 2021: 100.2 ML/MIN/1.73
EOSINOPHIL # BLD AUTO: 0.44 10*3/MM3 (ref 0–0.4)
EOSINOPHIL NFR BLD AUTO: 5.8 % (ref 0.3–6.2)
ERYTHROCYTE [DISTWIDTH] IN BLOOD BY AUTOMATED COUNT: 14.5 % (ref 12.3–15.4)
FOLATE SERPL-MCNC: 16.2 NG/ML (ref 4.78–24.2)
GLOBULIN SER CALC-MCNC: 2.2 GM/DL
GLUCOSE SERPL-MCNC: 101 MG/DL (ref 65–99)
HBA1C MFR BLD: 5.5 % (ref 4.8–5.6)
HCT VFR BLD AUTO: 38.8 % (ref 34–46.6)
HDLC SERPL-MCNC: 56 MG/DL (ref 40–60)
HGB BLD-MCNC: 12.8 G/DL (ref 12–15.9)
IMM GRANULOCYTES # BLD AUTO: 0.02 10*3/MM3 (ref 0–0.05)
IMM GRANULOCYTES NFR BLD AUTO: 0.3 % (ref 0–0.5)
LDLC SERPL CALC-MCNC: 139 MG/DL (ref 0–100)
LYMPHOCYTES # BLD AUTO: 1.32 10*3/MM3 (ref 0.7–3.1)
LYMPHOCYTES NFR BLD AUTO: 17.3 % (ref 19.6–45.3)
MCH RBC QN AUTO: 31 PG (ref 26.6–33)
MCHC RBC AUTO-ENTMCNC: 33 G/DL (ref 31.5–35.7)
MCV RBC AUTO: 93.9 FL (ref 79–97)
MONOCYTES # BLD AUTO: 0.31 10*3/MM3 (ref 0.1–0.9)
MONOCYTES NFR BLD AUTO: 4.1 % (ref 5–12)
NEUTROPHILS # BLD AUTO: 5.51 10*3/MM3 (ref 1.7–7)
NEUTROPHILS NFR BLD AUTO: 72.1 % (ref 42.7–76)
NRBC BLD AUTO-RTO: 0 /100 WBC (ref 0–0.2)
PLATELET # BLD AUTO: 290 10*3/MM3 (ref 140–450)
POTASSIUM SERPL-SCNC: 3.9 MMOL/L (ref 3.5–5.2)
PROT SERPL-MCNC: 6.4 G/DL (ref 6–8.5)
RBC # BLD AUTO: 4.13 10*6/MM3 (ref 3.77–5.28)
SODIUM SERPL-SCNC: 138 MMOL/L (ref 136–145)
T4 FREE SERPL-MCNC: 1.03 NG/DL (ref 0.93–1.7)
TRIGL SERPL-MCNC: 96 MG/DL (ref 0–150)
TSH SERPL DL<=0.005 MIU/L-ACNC: 5.13 UIU/ML (ref 0.27–4.2)
VIT B12 SERPL-MCNC: 741 PG/ML (ref 211–946)
VLDLC SERPL CALC-MCNC: 17 MG/DL (ref 5–40)
WBC # BLD AUTO: 7.63 10*3/MM3 (ref 3.4–10.8)

## 2024-04-24 PROCEDURE — 90471 IMMUNIZATION ADMIN: CPT | Performed by: FAMILY MEDICINE

## 2024-04-24 PROCEDURE — 99396 PREV VISIT EST AGE 40-64: CPT | Performed by: FAMILY MEDICINE

## 2024-04-24 PROCEDURE — 90715 TDAP VACCINE 7 YRS/> IM: CPT | Performed by: FAMILY MEDICINE

## 2024-04-24 RX ORDER — METHOTREXATE 2.5 MG/1
TABLET ORAL
COMMUNITY
Start: 2024-04-20

## 2024-04-24 RX ORDER — IBUPROFEN 800 MG/1
800 TABLET ORAL EVERY 6 HOURS PRN
COMMUNITY
Start: 2024-02-22

## 2024-04-24 RX ORDER — SULFAMETHOXAZOLE AND TRIMETHOPRIM 800; 160 MG/1; MG/1
1 TABLET ORAL EVERY 12 HOURS SCHEDULED
COMMUNITY
Start: 2024-04-16 | End: 2024-04-24

## 2024-04-24 NOTE — ASSESSMENT & PLAN NOTE
Patient's (Body mass index is 39.94 kg/m².) indicates that they are obese (BMI >30) with health conditions that include none . Weight is unchanged. BMI  is above average; BMI management plan is completed. We discussed portion control, increasing exercise, and pharmacologic options including zepbound.  Patient to notify the office within 3 weeks of starting the medication of how she is doing with it.  If doing well on zepbound without side effects, will increase to 5mg weekly .

## 2024-04-24 NOTE — ASSESSMENT & PLAN NOTE
A comprehensive discussion was held with the patient regarding routine health maintenance, inclusive of vaccines, dental/eye health, a balanced diet, regular exercise, colorectal cancer screening, breast cancer screening, and Pap smears. Additionally, her mental health has been addressed. Routine laboratory tests have been ordered.

## 2024-04-24 NOTE — LETTER
[facility]  Vaccine Consent Form    Patient Name:  Nesha Dominguez  Patient :  1965     Vaccine(s) Ordered    Tdap Vaccine Greater Than or Equal To 8yo IM        Screening Checklist  The following questions should be completed prior to vaccination. If you answer “yes” to any question, it does not necessarily mean you should not be vaccinated. It just means we may need to clarify or ask more questions. If a question is unclear, please ask your healthcare provider to explain it.    Yes No   Any fever or moderate to severe illness today (mild illness and/or antibiotic treatment are not contraindications)?     Do you have a history of a serious reaction to any previous vaccinations, such as anaphylaxis, encephalopathy within 7 days, Guillain-Jacksonville syndrome within 6 weeks, seizure?     Have you received any live vaccine(s) (e.g MMR, ELISSA) or any other vaccines in the last month (to ensure duplicate doses aren't given)?     Do you have an anaphylactic allergy to latex (DTaP, DTaP-IPV, Hep A, Hep B, MenB, RV, Td, Tdap), baker’s yeast (Hep B, HPV), polysorbates (RSV, nirsevimab, PCV 20, Rotavirrus, Tdap, Shingrix), or gelatin (ELISAS, MMR)?     Do you have an anaphylactic allergy to neomycin (Rabies, ELISSA, MMR, IPV, Hep A), polymyxin B (IPV), or streptomycin (IPV)?      Any cancer, leukemia, AIDS, or other immune system disorder? (ELISSA, MMR, RV)     Do you have a parent, brother, or sister with an immune system problem (if immune competence of vaccine recipient clinically verified, can proceed)? (MMR, ELISSA)     Any recent steroid treatments for >2 weeks, chemotherapy, or radiation treatment? (ELISSA, MMR)     Have you received antibody-containing blood transfusions or IVIG in the past 11 months (recommended interval is dependent on product)? (MMR, ELISSA)     Have you taken antiviral drugs (acyclovir, famciclovir, valacyclovir for ELISSA) in the last 24 or 48 hours, respectively?      Are you pregnant or planning to become  "pregnant within 1 month? (ELISSA, MMR, HPV, IPV, MenB, Abrexvy; For Hep B- refer to Engerix-B; For RSV - Abrysvo is indicated for 32-36 weeks of pregnancy from September to January)     For infants, have you ever been told your child has had intussusception or a medical emergency involving obstruction of the intestine (Rotavirus)? If not for an infant, can skip this question.         *Ordering Physicians/APC should be consulted if \"yes\" is checked by the patient or guardian above.  I have received, read, and understand the Vaccine Information Statement (VIS) for each vaccine ordered.  I have considered my or my child's health status as well as the health status of my close contacts.  I have taken the opportunity to discuss my vaccine questions with my or my child's health care provider.   I have requested that the ordered vaccine(s) be given to me or my child.  I understand the benefits and risks of the vaccines.  I understand that I should remain in the clinic for 15 minutes after receiving the vaccine(s).  _________________________________________________________  Signature of Patient or Parent/Legal Guardian ____________________  Date     "

## 2024-04-24 NOTE — PROGRESS NOTES
Nesha Dominguez is a 58 y.o. female.    Chief Complaint   Patient presents with    Annual Exam       HPI     Nesha Dominguez is a 58-year-old female who presents today for an annual physical exam.    Patient is currently not under the care of a rheumatologist, having previously consulted with one who advised against initiating any treatment as her condition was not advanced. Her psoriasis and psoriatic arthritis are being managed by Dr. Rowley, a dermatologist. She is also under the care of an Ear, Nose, and Throat specialist for Meniere's disease and is currently on Dyazide. She has not undergone a colonoscopy, but has previously undergone a Cologuard test. Her last mammogram was conducted in . She is overdue for her gynecological appointments and has not had a Pap smear.  Her lifestyle habits include a lack of energy and a need for weight loss, despite consuming healthy foods. She maintains a high water intake. She reports mild dysuria and questions the possibility of a urinary tract infection (UTI). She attempts to alleviate her symptoms with cranberry juice and increased fluid intake. She adds that she has some normal anxiety related to her children's graduation. Her specialists are managing her medications.    Patient has not received the shingles vaccine, but has a history of shingles during her high school years. She has not received any COVID-19 vaccines and does not typically receive the influenza vaccine. She does not recall when she received her last tetanus vaccine.    The following portions of the patient's history were reviewed and updated as appropriate: allergies, current medications, past family history, past medical history, past social history, past surgical history and problem list.     Past Medical History:   Diagnosis Date    Fatigue     Meniere's disease     Psoriatic arthritis        Past Surgical History:   Procedure Laterality Date     SECTION      DILATATION AND CURETTAGE      MOUTH  SURGERY      Tooth extraction    OTHER SURGICAL HISTORY      Renal Lithotripsy       Family History   Problem Relation Age of Onset    Heart disease Mother     Seizures Father     Diabetes Father     Heart attack Paternal Grandfather        Social History     Socioeconomic History    Marital status:    Tobacco Use    Smoking status: Never     Passive exposure: Never    Smokeless tobacco: Never   Vaping Use    Vaping status: Never Used   Substance and Sexual Activity    Alcohol use: Never    Drug use: Never    Sexual activity: Yes     Partners: Male       Allergies   Allergen Reactions    Biaxin [Clarithromycin] GI Intolerance    Levaquin [Levofloxacin] GI Intolerance         Current Outpatient Medications:     albuterol (PROVENTIL) (2.5 MG/3ML) 0.083% nebulizer solution, INHALE THE CONTENTS OF 1 VIAL IN NEBULIZER EVERY 4 HOURS AS NEEDED, Disp: 225 mL, Rfl: 1    cholecalciferol (VITAMIN D3) 25 MCG (1000 UT) tablet, Vitamin D3, Disp: , Rfl:     clobetasol (TEMOVATE) 0.05 % ointment, Apply  topically., Disp: , Rfl:     diazePAM (VALIUM) 2 MG tablet, diazepam 2 mg tablet, Disp: , Rfl:     ENSTILAR 0.005-0.064 % foam, , Disp: , Rfl: 1    ibuprofen (ADVIL,MOTRIN) 800 MG tablet, Take 1 tablet by mouth Every 6 (Six) Hours As Needed. for pain, Disp: , Rfl:     Ixekizumab (Taltz) 80 MG/ML solution auto-injector, Taltz Autoinjector 80 mg/mL subcutaneous, Disp: , Rfl:     Loratadine (Claritin) 10 MG capsule, Claritin, Disp: , Rfl:     methotrexate 2.5 MG tablet, TAKE 3 TABLETS BY MOUTH IN THE MORNING AND 3 TABLETS BY MOUTH IN THE EVENING ON SATURDAYS, Disp: , Rfl:     triamterene-hydrochlorothiazide (DYAZIDE) 37.5-25 MG per capsule, Take  by mouth Daily., Disp: , Rfl:     fluticasone (FLONASE) 50 MCG/ACT nasal spray, fluticasone propionate 50 mcg/actuation nasal spray,suspension  SHAKE LIQUID AND USE 1 SPRAY IN EACH NOSTRIL TWICE DAILY (Patient not taking: Reported on 4/24/2024), Disp: , Rfl:     Tirzepatide-Weight  "Management (ZEPBOUND) 2.5 MG/0.5ML solution auto-injector, Inject 0.5 mL under the skin into the appropriate area as directed 1 (One) Time Per Week., Disp: 2 mL, Rfl: 1    ROS    Review of Systems   Constitutional:  Positive for fatigue. Negative for chills and fever.   HENT:  Negative for congestion and rhinorrhea.    Eyes:  Negative for blurred vision, itching and visual disturbance.   Respiratory:  Negative for cough and shortness of breath.    Cardiovascular:  Negative for chest pain.   Gastrointestinal:  Negative for abdominal pain, constipation, diarrhea, nausea and vomiting.   Genitourinary:  Positive for dysuria. Negative for frequency.   Musculoskeletal:  Positive for arthralgias. Negative for back pain.   Skin:  Positive for rash. Negative for color change.   Neurological:  Negative for weakness, numbness and headache.   Hematological:  Does not bruise/bleed easily.   Psychiatric/Behavioral:  Negative for depressed mood. The patient is not nervous/anxious.        Vitals:    04/24/24 0920   BP: 124/82   BP Location: Right arm   Patient Position: Sitting   Cuff Size: Adult   Pulse: 66   Temp: 97 °F (36.1 °C)   SpO2: 97%   Weight: 109 kg (240 lb)   Height: 165.1 cm (65\")   PainSc: 0-No pain     Body mass index is 39.94 kg/m².    Physical Exam     Physical Exam  Constitutional:       General: She is not in acute distress.     Appearance: She is well-developed. She is obese.   HENT:      Head: Normocephalic and atraumatic.      Right Ear: Tympanic membrane and external ear normal.      Left Ear: Tympanic membrane and external ear normal.      Mouth/Throat:      Pharynx: No posterior oropharyngeal erythema.   Eyes:      Extraocular Movements: Extraocular movements intact.      Conjunctiva/sclera: Conjunctivae normal.      Pupils: Pupils are equal, round, and reactive to light.   Cardiovascular:      Rate and Rhythm: Normal rate and regular rhythm.      Heart sounds: No murmur heard.  Pulmonary:      Effort: " Pulmonary effort is normal. No respiratory distress.      Breath sounds: Normal breath sounds. No wheezing.   Abdominal:      General: Bowel sounds are normal. There is no distension.      Palpations: Abdomen is soft.      Tenderness: There is no abdominal tenderness.   Musculoskeletal:      Cervical back: Neck supple.      Right lower leg: No edema.      Left lower leg: No edema.   Lymphadenopathy:      Cervical: No cervical adenopathy.   Skin:     General: Skin is warm and dry.   Neurological:      Mental Status: She is alert and oriented to person, place, and time.      Cranial Nerves: No cranial nerve deficit.      Deep Tendon Reflexes: Reflexes normal.   Psychiatric:         Mood and Affect: Mood normal.         Behavior: Behavior normal.         Assessment/Plan    Diagnoses and all orders for this visit:    1. Well adult exam (Primary)  Assessment & Plan:  A comprehensive discussion was held with the patient regarding routine health maintenance, inclusive of vaccines, dental/eye health, a balanced diet, regular exercise, colorectal cancer screening, breast cancer screening, and Pap smears. Additionally, her mental health has been addressed. Routine laboratory tests have been ordered.      2. Screening for colorectal cancer  -     Cologuard - Stool, Per Rectum; Future    3. Screening mammogram for breast cancer  -     Mammo Screening Digital Tomosynthesis Bilateral With CAD    4. Fatigue, unspecified type  -     CBC & Differential  -     Comprehensive Metabolic Panel  -     Vitamin D,25-Hydroxy  -     TSH  -     Folate  -     Vitamin B12  -     T4, Free    5. Vitamin D deficiency  -     Vitamin D,25-Hydroxy    6. Screening cholesterol level  -     Lipid Panel    7. Screening for diabetes mellitus  -     Hemoglobin A1c    8. Obesity without serious comorbidity, unspecified classification, unspecified obesity type  Assessment & Plan:  Patient's (Body mass index is 39.94 kg/m².) indicates that they are obese (BMI  >30) with health conditions that include none . Weight is unchanged. BMI  is above average; BMI management plan is completed. We discussed portion control, increasing exercise, and pharmacologic options including zepbound.  Patient to notify the office within 3 weeks of starting the medication of how she is doing with it.  If doing well on zepbound without side effects, will increase to 5mg weekly .       Other orders  -     Tdap Vaccine Greater Than or Equal To 8yo IM  -     Tirzepatide-Weight Management (ZEPBOUND) 2.5 MG/0.5ML solution auto-injector; Inject 0.5 mL under the skin into the appropriate area as directed 1 (One) Time Per Week.  Dispense: 2 mL; Refill: 1        New Medications Ordered This Visit   Medications    Tirzepatide-Weight Management (ZEPBOUND) 2.5 MG/0.5ML solution auto-injector     Sig: Inject 0.5 mL under the skin into the appropriate area as directed 1 (One) Time Per Week.     Dispense:  2 mL     Refill:  1       Orders Placed This Encounter   Procedures    Tdap Vaccine Greater Than or Equal To 8yo IM       Return in about 3 months (around 7/24/2024) for weight check.      Geetha Ponce DO    Transcribed from ambient dictation for Geetha Ponce DO by Wellington Montaño.  04/24/24   12:02 EDT    Patient or patient representative verbalized consent to the visit recording.  I have personally performed the services described in this document as transcribed by the above individual, and it is both accurate and complete.  Geetha Ponce DO  4/24/2024  13:02 EDT

## 2024-05-02 ENCOUNTER — PRIOR AUTHORIZATION (OUTPATIENT)
Dept: INTERNAL MEDICINE | Facility: CLINIC | Age: 59
End: 2024-05-02
Payer: COMMERCIAL

## 2024-05-03 NOTE — TELEPHONE ENCOUNTER
[employer] received a request for coverage of ZEPBOUND 2.5/0.5 PEN for you.  Your request was denied based on the terms of your prescription benefit plan. This is  the initial adverse coverage determination for this request. The reason for the denial  was:  *Weight Management Program- M1 Reject - Your request for coverage was denied  because your plan requires enrollment and active participation in the [facility] Weight  Management™ program. Specifically, you must: (a) enroll in the program by completing  an initial eligibility survey, a virtual assessment with an assigned program clinician, and  obtain necessary lab work, (b) meet the minimum program participation criteria once  enrolled, which includes attending at least one live virtual meeting or member-initiated  chat with a Program clinician each month and logging at least one biomarker (e.g.,  body weight) in the Program gabriele each month, (c) meet the minimum weight loss  requirements specific to the medication you have been prescribed, and (d) fulfill pretherapy requirements specific to your benefit plan (if applicable, based upon your  benefit plan). “Pre-Therapy” refers to the time in which your Plan requires consistent  program participation prior to receiving coverage for your weight loss medications.  You have not met the requirement for coverage for one of the following reasons: you  have not completed the necessary steps to enroll in the Program, you have not met the  minimum Program participation criteria, you have not met the weight loss requirements